# Patient Record
Sex: FEMALE | Race: OTHER | HISPANIC OR LATINO | URBAN - METROPOLITAN AREA
[De-identification: names, ages, dates, MRNs, and addresses within clinical notes are randomized per-mention and may not be internally consistent; named-entity substitution may affect disease eponyms.]

---

## 2022-05-26 ENCOUNTER — OFFICE VISIT (OUTPATIENT)
Dept: URBAN - METROPOLITAN AREA CLINIC 60 | Facility: CLINIC | Age: 47
End: 2022-05-26

## 2022-07-09 ENCOUNTER — TELEPHONE ENCOUNTER (OUTPATIENT)
Dept: URBAN - METROPOLITAN AREA CLINIC 121 | Facility: CLINIC | Age: 47
End: 2022-07-09

## 2022-07-10 ENCOUNTER — TELEPHONE ENCOUNTER (OUTPATIENT)
Dept: URBAN - METROPOLITAN AREA CLINIC 121 | Facility: CLINIC | Age: 47
End: 2022-07-10

## 2022-07-10 RX ORDER — SUCRALFATE 1 G/1
TWICE A DAY TABLET ORAL TWICE A DAY
Refills: 0 | Status: ACTIVE | COMMUNITY
Start: 2022-05-26

## 2022-07-10 RX ORDER — OMEPRAZOLE 20 MG/1
ONCE A DAY CAPSULE, DELAYED RELEASE ORAL ONCE A DAY
Refills: 0 | Status: ACTIVE | COMMUNITY
Start: 2022-05-26

## 2022-07-28 ENCOUNTER — OFFICE VISIT (OUTPATIENT)
Dept: URBAN - METROPOLITAN AREA SURGERY CENTER 4 | Facility: SURGERY CENTER | Age: 47
End: 2022-07-28
Payer: COMMERCIAL

## 2022-07-28 DIAGNOSIS — K44.9 DIAPHRAGMATIC HERNIA WITHOUT OBSTRUCTION OR GANGRENE: ICD-10-CM

## 2022-07-28 DIAGNOSIS — K29.50 UNSPECIFIED CHRONIC GASTRITIS WITHOUT BLEEDING: ICD-10-CM

## 2022-07-28 DIAGNOSIS — K21.9 GASTRO-ESOPHAGEAL REFLUX DISEASE WITHOUT ESOPHAGITIS: ICD-10-CM

## 2022-07-28 PROCEDURE — G8918 PT W/O PREOP ORDER IV AB PRO: HCPCS | Performed by: INTERNAL MEDICINE

## 2022-07-28 PROCEDURE — 43239 EGD BIOPSY SINGLE/MULTIPLE: CPT | Performed by: INTERNAL MEDICINE

## 2022-07-28 PROCEDURE — G8907 PT DOC NO EVENTS ON DISCHARG: HCPCS | Performed by: INTERNAL MEDICINE

## 2022-08-01 ENCOUNTER — TELEPHONE ENCOUNTER (OUTPATIENT)
Dept: URBAN - METROPOLITAN AREA CLINIC 63 | Facility: CLINIC | Age: 47
End: 2022-08-01

## 2022-08-04 PROBLEM — 266435005 GASTRO-ESOPHAGEAL REFLUX DISEASE WITHOUT ESOPHAGITIS: Status: ACTIVE | Noted: 2022-08-04

## 2022-08-04 PROBLEM — 84568007 ATROPHIC GASTRITIS: Status: ACTIVE | Noted: 2022-08-04

## 2022-08-11 ENCOUNTER — WEB ENCOUNTER (OUTPATIENT)
Dept: URBAN - METROPOLITAN AREA CLINIC 60 | Facility: CLINIC | Age: 47
End: 2022-08-11

## 2022-08-11 ENCOUNTER — OFFICE VISIT (OUTPATIENT)
Dept: URBAN - METROPOLITAN AREA CLINIC 60 | Facility: CLINIC | Age: 47
End: 2022-08-11
Payer: COMMERCIAL

## 2022-08-11 VITALS
WEIGHT: 111 LBS | HEIGHT: 60 IN | RESPIRATION RATE: 20 BRPM | TEMPERATURE: 97.9 F | DIASTOLIC BLOOD PRESSURE: 70 MMHG | OXYGEN SATURATION: 98 % | SYSTOLIC BLOOD PRESSURE: 120 MMHG | BODY MASS INDEX: 21.79 KG/M2 | HEART RATE: 109 BPM

## 2022-08-11 DIAGNOSIS — K22.719 BARRETT'S ESOPHAGUS WITH DYSPLASIA: ICD-10-CM

## 2022-08-11 DIAGNOSIS — Z12.11 ENCOUNTER FOR SCREENING COLONOSCOPY: ICD-10-CM

## 2022-08-11 PROBLEM — 1082751000119109: Status: ACTIVE | Noted: 2022-08-11

## 2022-08-11 PROCEDURE — 99214 OFFICE O/P EST MOD 30 MIN: CPT | Performed by: NURSE PRACTITIONER

## 2022-08-11 RX ORDER — OMEPRAZOLE 20 MG/1
1 CAPSULE 30 MINUTES BEFORE MORNING MEAL CAPSULE, DELAYED RELEASE ORAL ONCE A DAY
Qty: 90 | Refills: 0 | OUTPATIENT
Start: 2022-08-11

## 2022-08-18 ENCOUNTER — LAB OUTSIDE AN ENCOUNTER (OUTPATIENT)
Dept: URBAN - METROPOLITAN AREA CLINIC 60 | Facility: CLINIC | Age: 47
End: 2022-08-18

## 2022-08-25 ENCOUNTER — TELEPHONE ENCOUNTER (OUTPATIENT)
Dept: URBAN - METROPOLITAN AREA SURGERY CENTER 4 | Facility: SURGERY CENTER | Age: 47
End: 2022-08-25

## 2022-08-25 RX ORDER — OMEPRAZOLE 20 MG/1
1 CAPSULE 30 MINUTES BEFORE MORNING MEAL CAPSULE, DELAYED RELEASE ORAL ONCE A DAY
Qty: 90 | Refills: 0 | Status: ACTIVE | COMMUNITY
Start: 2022-08-11

## 2022-08-25 RX ORDER — OMEPRAZOLE 40 MG/1
1 CAPSULE 30 MINUTES BEFORE MORNING MEAL CAPSULE, DELAYED RELEASE ORAL ONCE A DAY
Qty: 60 CAPSULE | Refills: 0 | OUTPATIENT
Start: 2022-08-29

## 2022-08-25 RX ORDER — SUCRALFATE 1 G/1
1 TABLET ON AN EMPTY STOMACH TABLET ORAL TWICE A DAY
Qty: 120 TABLET | Refills: 0 | OUTPATIENT
Start: 2022-08-29 | End: 2022-10-28

## 2022-09-08 ENCOUNTER — TELEPHONE ENCOUNTER (OUTPATIENT)
Dept: URBAN - METROPOLITAN AREA CLINIC 60 | Facility: CLINIC | Age: 47
End: 2022-09-08

## 2022-09-08 RX ORDER — OMEPRAZOLE 20 MG/1
1 CAPSULE 30 MINUTES BEFORE MORNING MEAL CAPSULE, DELAYED RELEASE ORAL ONCE A DAY
Qty: 90 | Refills: 0 | Status: ACTIVE | COMMUNITY
Start: 2022-08-11

## 2022-09-08 RX ORDER — OMEPRAZOLE 40 MG/1
1 CAPSULE 30 MINUTES BEFORE MORNING MEAL CAPSULE, DELAYED RELEASE ORAL ONCE A DAY
Qty: 60 CAPSULE | Refills: 0 | Status: ACTIVE | COMMUNITY
Start: 2022-08-29

## 2022-09-08 RX ORDER — SUCRALFATE 1 G/1
1 TABLET ON AN EMPTY STOMACH TABLET ORAL TWICE A DAY
Qty: 180 TABLET | Refills: 0 | OUTPATIENT
Start: 2022-09-09 | End: 2022-12-07

## 2022-09-08 RX ORDER — SUCRALFATE 1 G/1
1 TABLET ON AN EMPTY STOMACH TABLET ORAL TWICE A DAY
Qty: 120 TABLET | Refills: 0 | Status: ACTIVE | COMMUNITY
Start: 2022-08-29 | End: 2022-10-28

## 2022-09-12 ENCOUNTER — TELEPHONE ENCOUNTER (OUTPATIENT)
Dept: URBAN - METROPOLITAN AREA CLINIC 60 | Facility: CLINIC | Age: 47
End: 2022-09-12

## 2022-09-23 ENCOUNTER — CLAIMS CREATED FROM THE CLAIM WINDOW (OUTPATIENT)
Dept: URBAN - METROPOLITAN AREA SURGERY CENTER 4 | Facility: SURGERY CENTER | Age: 47
End: 2022-09-23

## 2022-09-23 ENCOUNTER — CLAIMS CREATED FROM THE CLAIM WINDOW (OUTPATIENT)
Dept: URBAN - METROPOLITAN AREA SURGERY CENTER 4 | Facility: SURGERY CENTER | Age: 47
End: 2022-09-23
Payer: COMMERCIAL

## 2022-09-23 DIAGNOSIS — K64.1 GRADE II INTERNAL HEMORRHOIDS: ICD-10-CM

## 2022-09-23 DIAGNOSIS — D12.0 ADENOMATOUS POLYP OF CECUM: ICD-10-CM

## 2022-09-23 DIAGNOSIS — D12.4 ADENOMATOUS POLYP OF DESCENDING COLON: ICD-10-CM

## 2022-09-23 DIAGNOSIS — Z12.11 ENCOUNTER FOR SCREENING COLONOSCOPY: ICD-10-CM

## 2022-09-23 PROCEDURE — 45380 COLONOSCOPY AND BIOPSY: CPT | Performed by: CLINIC/CENTER

## 2022-09-23 PROCEDURE — G8918 PT W/O PREOP ORDER IV AB PRO: HCPCS | Performed by: CLINIC/CENTER

## 2022-09-23 PROCEDURE — 45380 COLONOSCOPY AND BIOPSY: CPT | Performed by: INTERNAL MEDICINE

## 2022-09-23 PROCEDURE — G8907 PT DOC NO EVENTS ON DISCHARG: HCPCS | Performed by: CLINIC/CENTER

## 2022-09-23 RX ORDER — SUCRALFATE 1 G/1
1 TABLET ON AN EMPTY STOMACH TABLET ORAL TWICE A DAY
Qty: 120 TABLET | Refills: 0 | Status: ACTIVE | COMMUNITY
Start: 2022-08-29 | End: 2022-10-28

## 2022-09-23 RX ORDER — OMEPRAZOLE 40 MG/1
1 CAPSULE 30 MINUTES BEFORE MORNING MEAL CAPSULE, DELAYED RELEASE ORAL ONCE A DAY
Qty: 60 CAPSULE | Refills: 0 | Status: ACTIVE | COMMUNITY
Start: 2022-08-29

## 2022-09-23 RX ORDER — SUCRALFATE 1 G/1
1 TABLET ON AN EMPTY STOMACH TABLET ORAL TWICE A DAY
Qty: 180 TABLET | Refills: 0 | Status: ACTIVE | COMMUNITY
Start: 2022-09-09 | End: 2022-12-07

## 2022-09-23 RX ORDER — OMEPRAZOLE 20 MG/1
1 CAPSULE 30 MINUTES BEFORE MORNING MEAL CAPSULE, DELAYED RELEASE ORAL ONCE A DAY
Qty: 90 | Refills: 0 | Status: ACTIVE | COMMUNITY
Start: 2022-08-11

## 2022-10-07 ENCOUNTER — OFFICE VISIT (OUTPATIENT)
Dept: URBAN - METROPOLITAN AREA CLINIC 60 | Facility: CLINIC | Age: 47
End: 2022-10-07

## 2022-11-01 ENCOUNTER — LAB OUTSIDE AN ENCOUNTER (OUTPATIENT)
Dept: URBAN - METROPOLITAN AREA CLINIC 60 | Facility: CLINIC | Age: 47
End: 2022-11-01

## 2022-11-01 ENCOUNTER — TELEPHONE ENCOUNTER (OUTPATIENT)
Dept: URBAN - METROPOLITAN AREA CLINIC 63 | Facility: CLINIC | Age: 47
End: 2022-11-01

## 2022-11-01 ENCOUNTER — ERX REFILL RESPONSE (OUTPATIENT)
Dept: URBAN - METROPOLITAN AREA CLINIC 57 | Facility: CLINIC | Age: 47
End: 2022-11-01

## 2022-11-01 ENCOUNTER — OFFICE VISIT (OUTPATIENT)
Dept: URBAN - METROPOLITAN AREA CLINIC 60 | Facility: CLINIC | Age: 47
End: 2022-11-01
Payer: COMMERCIAL

## 2022-11-01 VITALS
HEIGHT: 60 IN | OXYGEN SATURATION: 98 % | BODY MASS INDEX: 21.79 KG/M2 | RESPIRATION RATE: 20 BRPM | HEART RATE: 97 BPM | TEMPERATURE: 97.9 F | WEIGHT: 111 LBS | DIASTOLIC BLOOD PRESSURE: 72 MMHG | SYSTOLIC BLOOD PRESSURE: 120 MMHG

## 2022-11-01 DIAGNOSIS — K29.50 CHRONIC GASTRITIS WITHOUT BLEEDING, UNSPECIFIED GASTRITIS TYPE: ICD-10-CM

## 2022-11-01 DIAGNOSIS — K44.9 HIATAL HERNIA: ICD-10-CM

## 2022-11-01 DIAGNOSIS — K21.9 GASTROESOPHAGEAL REFLUX DISEASE WITHOUT ESOPHAGITIS: ICD-10-CM

## 2022-11-01 DIAGNOSIS — K22.70 BARRETT'S ESOPHAGUS WITHOUT DYSPLASIA: ICD-10-CM

## 2022-11-01 PROBLEM — 8493009: Status: ACTIVE | Noted: 2022-09-09

## 2022-11-01 PROCEDURE — 99214 OFFICE O/P EST MOD 30 MIN: CPT | Performed by: NURSE PRACTITIONER

## 2022-11-01 RX ORDER — SUCRALFATE 1 G/1
1 TABLET ON AN EMPTY STOMACH TABLET ORAL TWICE A DAY
Qty: 180 TABLET | Refills: 0 | Status: ACTIVE | COMMUNITY
Start: 2022-09-09 | End: 2022-12-07

## 2022-11-01 RX ORDER — OMEPRAZOLE 40 MG/1
1 CAPSULE 30 MINUTES BEFORE MORNING MEAL CAPSULE, DELAYED RELEASE ORAL ONCE A DAY
Qty: 60 CAPSULE | Refills: 0 | OUTPATIENT

## 2022-11-01 RX ORDER — DEXLANSOPRAZOLE 30 MG/1
1 CAPSULE CAPSULE, DELAYED RELEASE ORAL ONCE A DAY
Qty: 30 | OUTPATIENT
Start: 2022-11-01

## 2022-11-01 RX ORDER — OMEPRAZOLE 40 MG/1
TAKE 1 CAPSULE 30 MINUTES BEFORE MORNING MEAL ORALLY ONCE A DAY 60 DAYS CAPSULE, DELAYED RELEASE ORAL
Qty: 30 CAPSULE | Refills: 0 | OUTPATIENT

## 2022-11-01 RX ORDER — OMEPRAZOLE 40 MG/1
1 CAPSULE 30 MINUTES BEFORE MORNING MEAL CAPSULE, DELAYED RELEASE ORAL ONCE A DAY
Qty: 60 CAPSULE | Refills: 0 | Status: ACTIVE | COMMUNITY
Start: 2022-08-29

## 2022-11-01 NOTE — HPI-TODAY'S VISIT:
5/22 Patient here today in good general state, she is here complaining of symptom of gastritis and reflux.  Her symptoms are chronic but now seems to be worse.  Her last EGD was done in [place] 3 years ago and it shows evidence of chronic gastritis and esophageal mucosa with chronic inflammation due to reflux.  Patient also complaining of symptom of dyspepsia and constipation.  The symptoms are chronic as well. Plan: Patient will do diet for gastritis and reflux, treatment with Carafate 1 g twice a day and omeprazole 20 mg once a day. EGD. Will increase exercises and fluid intake, a trial with Metamucil.  Some samples of Linzess were given to her. FibroScan and right upper quadrant ultrasound.  8/22 Right upper quadrant ultrasound shows evidence of pancreas obscured by overlying bowel gas, otherwise normal. EGD: Demonstrating small hiatal hernia and chronic gastritis.  Biopsy results duodenal mucosa with no pathologic changes.  Gastric antrum/body, biopsy with chronic inactive gastritis, negative for H. pylori.  GE junction biopsy shows evidence of reactive changes favor reflux induced, and glandular mucosa with focal intestinal metaplasia and mild inflammation, negative for dysplasia, Candida or malignancy. Patient is here today she says symptoms of gastritis and reflux are better.  She is requesting screening colonoscopy.  Never had colonoscopy done denies any rectal bleeding, or change in her bowel habits. 11/22 Patient colonoscopy shows evidence of a 7 mm polyp into the cecum, a 5 mm polyp into the descending colon, internal hemorrhoids, and tortuous colon.  Both polyps were tubular adenoma polyps.  EGD demonstrated small hiatal hernia, chronic gastritis, normal examined duodenum and esophagus.  Duodenal mucosa with no pathologic changes.  Gastric antrum/body biopsy chronic inactive gastritis, negative for H. pylori.  GE junction biopsy esophageal squamous mucosa with reactive changes favored reflux induced.  Glandular mucosa with focal intestinal metaplasia and mild inflammation, negative for dysplasia, malignancy, or Candida.  Next colonoscopy in 5-year. Diet and treatment for esophagitis/esophageal intestinal metaplasia and chronic gastritis.  With omeprazole 20 mg daily.  EGD in 1 year. Noticed some improvement, but she is thinking, omeprazole is not working good for her.  She is still having acid reflux mainly in the morning time, we will start on Dexilant 30 mg twice a day.  Continue with Carafate for 4 weeks more.  We will do manometry studies and upper GI series.

## 2022-11-03 ENCOUNTER — TELEPHONE ENCOUNTER (OUTPATIENT)
Dept: URBAN - METROPOLITAN AREA CLINIC 63 | Facility: CLINIC | Age: 47
End: 2022-11-03

## 2022-11-04 ENCOUNTER — TELEPHONE ENCOUNTER (OUTPATIENT)
Dept: URBAN - METROPOLITAN AREA CLINIC 64 | Facility: CLINIC | Age: 47
End: 2022-11-04

## 2022-11-04 RX ORDER — OMEPRAZOLE 40 MG/1
1 CAPSULE 30 MINUTES BEFORE MORNING MEAL CAPSULE, DELAYED RELEASE ORAL ONCE A DAY
Qty: 60 CAPSULE | Refills: 0 | Status: ACTIVE | COMMUNITY
Start: 2022-08-29

## 2022-11-04 RX ORDER — SUCRALFATE 1 G/1
1 TABLET ON AN EMPTY STOMACH TABLET ORAL TWICE A DAY
Qty: 180 TABLET | Refills: 0 | Status: ACTIVE | COMMUNITY
Start: 2022-09-09 | End: 2022-12-07

## 2022-11-04 RX ORDER — ESOMEPRAZOLE MAGNESIUM 20 MG/1
1 CAPSULE CAPSULE, DELAYED RELEASE ORAL ONCE A DAY
Qty: 90 CAPSULE | Refills: 0 | OUTPATIENT
Start: 2022-11-04

## 2022-11-04 RX ORDER — DEXLANSOPRAZOLE 30 MG/1
1 CAPSULE CAPSULE, DELAYED RELEASE ORAL ONCE A DAY
Qty: 30 | Status: ACTIVE | COMMUNITY
Start: 2022-11-01

## 2022-12-01 ENCOUNTER — OFFICE VISIT (OUTPATIENT)
Dept: URBAN - METROPOLITAN AREA CLINIC 60 | Facility: CLINIC | Age: 47
End: 2022-12-01

## 2022-12-01 ENCOUNTER — CLAIMS CREATED FROM THE CLAIM WINDOW (OUTPATIENT)
Dept: URBAN - METROPOLITAN AREA CLINIC 61 | Facility: CLINIC | Age: 47
End: 2022-12-01
Payer: COMMERCIAL

## 2022-12-01 DIAGNOSIS — K21.9 GASTROESOPHAGEAL REFLUX DISEASE WITHOUT ESOPHAGITIS: ICD-10-CM

## 2022-12-01 DIAGNOSIS — K44.9 HIATAL HERNIA: ICD-10-CM

## 2022-12-01 DIAGNOSIS — K22.70 BARRETT'S ESOPHAGUS WITHOUT DYSPLASIA: ICD-10-CM

## 2022-12-01 PROCEDURE — 91037 ESOPH IMPED FUNCTION TEST: CPT | Performed by: INTERNAL MEDICINE

## 2022-12-01 PROCEDURE — 91035 G-ESOPH REFLX TST W/ELECTROD: CPT | Performed by: INTERNAL MEDICINE

## 2022-12-01 RX ORDER — OMEPRAZOLE 40 MG/1
1 CAPSULE 30 MINUTES BEFORE MORNING MEAL CAPSULE, DELAYED RELEASE ORAL ONCE A DAY
Qty: 60 CAPSULE | Refills: 0 | Status: ACTIVE | COMMUNITY
Start: 2022-08-29

## 2022-12-01 RX ORDER — DEXLANSOPRAZOLE 30 MG/1
1 CAPSULE CAPSULE, DELAYED RELEASE ORAL ONCE A DAY
Qty: 30 | Status: ACTIVE | COMMUNITY
Start: 2022-11-01

## 2022-12-01 RX ORDER — ESOMEPRAZOLE MAGNESIUM 20 MG/1
1 CAPSULE CAPSULE, DELAYED RELEASE ORAL ONCE A DAY
Qty: 90 CAPSULE | Refills: 0 | Status: ACTIVE | COMMUNITY
Start: 2022-11-04

## 2022-12-01 RX ORDER — SUCRALFATE 1 G/1
1 TABLET ON AN EMPTY STOMACH TABLET ORAL TWICE A DAY
Qty: 180 TABLET | Refills: 0 | Status: ACTIVE | COMMUNITY
Start: 2022-09-09 | End: 2022-12-07

## 2022-12-06 PROBLEM — 266435005: Status: ACTIVE | Noted: 2022-11-01

## 2022-12-08 ENCOUNTER — OFFICE VISIT (OUTPATIENT)
Dept: URBAN - METROPOLITAN AREA CLINIC 60 | Facility: CLINIC | Age: 47
End: 2022-12-08
Payer: COMMERCIAL

## 2022-12-08 VITALS
RESPIRATION RATE: 20 BRPM | BODY MASS INDEX: 21.79 KG/M2 | HEIGHT: 60 IN | TEMPERATURE: 97.8 F | DIASTOLIC BLOOD PRESSURE: 78 MMHG | OXYGEN SATURATION: 98 % | HEART RATE: 81 BPM | SYSTOLIC BLOOD PRESSURE: 120 MMHG | WEIGHT: 111 LBS

## 2022-12-08 DIAGNOSIS — K21.00 GASTROESOPHAGEAL REFLUX DISEASE WITH ESOPHAGITIS WITHOUT HEMORRHAGE: ICD-10-CM

## 2022-12-08 DIAGNOSIS — K64.1 GRADE II HEMORRHOIDS: ICD-10-CM

## 2022-12-08 DIAGNOSIS — K29.50 OTHER CHRONIC GASTRITIS WITHOUT HEMORRHAGE: ICD-10-CM

## 2022-12-08 DIAGNOSIS — K44.9 HIATAL HERNIA: ICD-10-CM

## 2022-12-08 DIAGNOSIS — K22.70 BARRETT'S ESOPHAGUS WITHOUT DYSPLASIA: ICD-10-CM

## 2022-12-08 DIAGNOSIS — D12.6 ADENOMATOUS POLYP OF COLON, UNSPECIFIED PART OF COLON: ICD-10-CM

## 2022-12-08 PROBLEM — 302914006: Status: ACTIVE | Noted: 2022-11-01

## 2022-12-08 PROCEDURE — 99214 OFFICE O/P EST MOD 30 MIN: CPT | Performed by: NURSE PRACTITIONER

## 2022-12-08 RX ORDER — PANTOPRAZOLE SODIUM 20 MG/1
1 TABLET TABLET, DELAYED RELEASE ORAL ONCE A DAY
Qty: 90 TABLETS | Refills: 0 | OUTPATIENT

## 2022-12-08 RX ORDER — SUCRALFATE 1 G/1
1 TABLET ON AN EMPTY STOMACH TABLET ORAL TWICE A DAY
Status: ACTIVE | COMMUNITY

## 2022-12-08 RX ORDER — FAMOTIDINE 20 MG/1
1 TABLET AT BEDTIME AS NEEDED TABLET, FILM COATED ORAL ONCE A DAY
Qty: 90 TABLET | Refills: 0 | OUTPATIENT

## 2022-12-08 RX ORDER — ESOMEPRAZOLE MAGNESIUM 20 MG/1
1 CAPSULE CAPSULE, DELAYED RELEASE ORAL BID
Qty: 180 CAPSULE | Refills: 0 | Status: ACTIVE | COMMUNITY
Start: 2022-11-04

## 2022-12-08 NOTE — HPI-TODAY'S VISIT:
5/22 Patient here today in good general state, she is here complaining of symptom of gastritis and reflux.  Her symptoms are chronic but now seems to be worse.  Her last EGD was done in [place] 3 years ago and it shows evidence of chronic gastritis and esophageal mucosa with chronic inflammation due to reflux.  Patient also complaining of symptom of dyspepsia and constipation.  The symptoms are chronic as well. Plan: Patient will do diet for gastritis and reflux, treatment with Carafate 1 g twice a day and omeprazole 20 mg once a day. EGD. Will increase exercises and fluid intake, a trial with Metamucil.  Some samples of Linzess were given to her. FibroScan and right upper quadrant ultrasound.  8/22 Right upper quadrant ultrasound shows evidence of pancreas obscured by overlying bowel gas, otherwise normal. EGD: Demonstrating small hiatal hernia and chronic gastritis.  Biopsy results duodenal mucosa with no pathologic changes.  Gastric antrum/body, biopsy with chronic inactive gastritis, negative for H. pylori.  GE junction biopsy shows evidence of reactive changes favor reflux induced, and glandular mucosa with focal intestinal metaplasia and mild inflammation, negative for dysplasia, Candida or malignancy. Patient is here today she says symptoms of gastritis and reflux are better.  She is requesting screening colonoscopy.  Never had colonoscopy done denies any rectal bleeding, or change in her bowel habits. 11/22 Patient colonoscopy shows evidence of a 7 mm polyp into the cecum, a 5 mm polyp into the descending colon, internal hemorrhoids, and tortuous colon.  Both polyps were tubular adenoma polyps.  EGD demonstrated small hiatal hernia, chronic gastritis, normal examined duodenum and esophagus.  Duodenal mucosa with no pathologic changes.  Gastric antrum/body biopsy chronic inactive gastritis, negative for H. pylori.  GE junction biopsy esophageal squamous mucosa with reactive changes favored reflux induced.  Glandular mucosa with focal intestinal metaplasia and mild inflammation, negative for dysplasia, malignancy, or Candida.  Next colonoscopy in 5-year. Diet and treatment for esophagitis/esophageal intestinal metaplasia and chronic gastritis.  With omeprazole 20 mg daily.  EGD in 1 year. Noticed some improvement, but she is thinking, omeprazole is not working good for her.  She is still having acid reflux mainly in the morning time, we will start on Dexilant 30 mg twice a day.  Continue with Carafate for 4 weeks more.  We will do manometry studies and upper GI series.  12/22 Patient is here today complaining of severe symptoms of reflux, she is doing treatment with PPI and Carafate, also doing diet with no improvement of her symptoms.  Esophageal manometry and upper GI series were normal, no evidence of reflux or any other abnormality.  We will change her treatment to pantoprazole 20 mg once a day and famotidine 40 mg at bedtime.  Patient will continue with diet.  I will see her in a month.

## 2022-12-19 ENCOUNTER — TELEPHONE ENCOUNTER (OUTPATIENT)
Dept: URBAN - METROPOLITAN AREA CLINIC 64 | Facility: CLINIC | Age: 47
End: 2022-12-19

## 2022-12-19 RX ORDER — FAMOTIDINE 40 MG/1
1 TABLET AT BEDTIME TABLET, FILM COATED ORAL ONCE A DAY
Qty: 30 | OUTPATIENT
Start: 2022-12-19

## 2022-12-30 ENCOUNTER — TELEPHONE ENCOUNTER (OUTPATIENT)
Dept: URBAN - METROPOLITAN AREA CLINIC 63 | Facility: CLINIC | Age: 47
End: 2022-12-30

## 2023-01-06 ENCOUNTER — OFFICE VISIT (OUTPATIENT)
Dept: URBAN - METROPOLITAN AREA CLINIC 60 | Facility: CLINIC | Age: 48
End: 2023-01-06

## 2023-01-17 ENCOUNTER — OFFICE VISIT (OUTPATIENT)
Dept: URBAN - METROPOLITAN AREA CLINIC 60 | Facility: CLINIC | Age: 48
End: 2023-01-17
Payer: COMMERCIAL

## 2023-01-17 VITALS
BODY MASS INDEX: 21.4 KG/M2 | OXYGEN SATURATION: 99 % | TEMPERATURE: 98.1 F | HEIGHT: 60 IN | SYSTOLIC BLOOD PRESSURE: 120 MMHG | WEIGHT: 109 LBS | RESPIRATION RATE: 20 BRPM | DIASTOLIC BLOOD PRESSURE: 76 MMHG | HEART RATE: 95 BPM

## 2023-01-17 DIAGNOSIS — K64.1 GRADE II HEMORRHOIDS: ICD-10-CM

## 2023-01-17 DIAGNOSIS — D12.6 ADENOMATOUS POLYP OF COLON, UNSPECIFIED PART OF COLON: ICD-10-CM

## 2023-01-17 DIAGNOSIS — K44.9 HIATAL HERNIA: ICD-10-CM

## 2023-01-17 DIAGNOSIS — K29.50 OTHER CHRONIC GASTRITIS WITHOUT HEMORRHAGE: ICD-10-CM

## 2023-01-17 DIAGNOSIS — K21.00 GASTROESOPHAGEAL REFLUX DISEASE WITH ESOPHAGITIS WITHOUT HEMORRHAGE: ICD-10-CM

## 2023-01-17 DIAGNOSIS — F41.9 ANXIETY: ICD-10-CM

## 2023-01-17 PROBLEM — 266433003: Status: ACTIVE | Noted: 2022-12-08

## 2023-01-17 PROBLEM — 48694002: Status: ACTIVE | Noted: 2023-01-17

## 2023-01-17 PROBLEM — 8493009: Status: ACTIVE | Noted: 2022-11-04

## 2023-01-17 PROCEDURE — 99213 OFFICE O/P EST LOW 20 MIN: CPT | Performed by: NURSE PRACTITIONER

## 2023-01-17 RX ORDER — PANTOPRAZOLE SODIUM 20 MG/1
1 TABLET TABLET, DELAYED RELEASE ORAL ONCE A DAY
Qty: 90 TABLETS | Refills: 0 | Status: ACTIVE | COMMUNITY

## 2023-01-17 RX ORDER — PANTOPRAZOLE SODIUM 20 MG/1
1 TABLET TABLET, DELAYED RELEASE ORAL ONCE A DAY
Qty: 90 TABLETS | Refills: 0 | OUTPATIENT

## 2023-01-17 RX ORDER — FAMOTIDINE 20 MG/1
1 TABLET AT BEDTIME AS NEEDED TABLET, FILM COATED ORAL ONCE A DAY
Qty: 90 TABLET | Refills: 0 | OUTPATIENT

## 2023-01-17 RX ORDER — FAMOTIDINE 20 MG/1
1 TABLET AT BEDTIME AS NEEDED TABLET, FILM COATED ORAL ONCE A DAY
Qty: 90 TABLET | Refills: 0 | Status: ACTIVE | COMMUNITY

## 2023-01-17 NOTE — HPI-TODAY'S VISIT:
5/22 Patient here today in good general state, she is here complaining of symptom of gastritis and reflux.  Her symptoms are chronic but now seems to be worse.  Her last EGD was done in [place] 3 years ago and it shows evidence of chronic gastritis and esophageal mucosa with chronic inflammation due to reflux.  Patient also complaining of symptom of dyspepsia and constipation.  The symptoms are chronic as well. Plan: Patient will do diet for gastritis and reflux, treatment with Carafate 1 g twice a day and omeprazole 20 mg once a day. EGD. Will increase exercises and fluid intake, a trial with Metamucil.  Some samples of Linzess were given to her. FibroScan and right upper quadrant ultrasound.  8/22 Right upper quadrant ultrasound shows evidence of pancreas obscured by overlying bowel gas, otherwise normal. EGD: Demonstrating small hiatal hernia and chronic gastritis.  Biopsy results duodenal mucosa with no pathologic changes.  Gastric antrum/body, biopsy with chronic inactive gastritis, negative for H. pylori.  GE junction biopsy shows evidence of reactive changes favor reflux induced, and glandular mucosa with focal intestinal metaplasia and mild inflammation, negative for dysplasia, Candida or malignancy. Patient is here today she says symptoms of gastritis and reflux are better.  She is requesting screening colonoscopy.  Never had colonoscopy done denies any rectal bleeding, or change in her bowel habits. 11/22 Patient colonoscopy shows evidence of a 7 mm polyp into the cecum, a 5 mm polyp into the descending colon, internal hemorrhoids, and tortuous colon.  Both polyps were tubular adenoma polyps.  EGD demonstrated small hiatal hernia, chronic gastritis, normal examined duodenum and esophagus.  Duodenal mucosa with no pathologic changes.  Gastric antrum/body biopsy chronic inactive gastritis, negative for H. pylori.  GE junction biopsy esophageal squamous mucosa with reactive changes favored reflux induced.  Glandular mucosa with focal intestinal metaplasia and mild inflammation, negative for dysplasia, malignancy, or Candida.  Next colonoscopy in 5-year. Diet and treatment for esophagitis/esophageal intestinal metaplasia and chronic gastritis.  With omeprazole 20 mg daily.  EGD in 1 year. Noticed some improvement, but she is thinking, omeprazole is not working good for her.  She is still having acid reflux mainly in the morning time, we will start on Dexilant 30 mg twice a day.  Continue with Carafate for 4 weeks more.  We will do manometry studies and upper GI series.  12/22 Patient is here today complaining of severe symptoms of reflux, she is doing treatment with PPI and Carafate, also doing diet with no improvement of her symptoms.  Esophageal manometry and upper GI series were normal, no evidence of reflux or any other abnormality.  We will change her treatment to pantoprazole 20 mg once a day and famotidine 40 mg at bedtime.  Patient will continue with diet.  I will see her in a month.  1/23 Patient with medical history of Mai esophagus, hiatal hernia and chronic gastritis. She claims to have a lot of anxiety, which increase her symptoms of gastritis and reflux. She is doing diet and treatment with PPI and famotidine and still having flares of GERD.  Patient will continue with present plan and will see a psychologist.

## 2023-01-18 ENCOUNTER — ERX REFILL RESPONSE (OUTPATIENT)
Dept: URBAN - METROPOLITAN AREA CLINIC 57 | Facility: CLINIC | Age: 48
End: 2023-01-18

## 2023-01-18 RX ORDER — FAMOTIDINE 40 MG/1
TAKE 1 TABLET BY MOUTH EVERY DAY AT BEDTIME FOR 30 DAYS TABLET, FILM COATED ORAL
Qty: 30 TABLET | Refills: 0 | OUTPATIENT

## 2023-01-18 RX ORDER — FAMOTIDINE 20 MG/1
1 TABLET AT BEDTIME AS NEEDED TABLET, FILM COATED ORAL ONCE A DAY
Qty: 90 TABLET | Refills: 0 | OUTPATIENT

## 2023-02-15 ENCOUNTER — ERX REFILL RESPONSE (OUTPATIENT)
Dept: URBAN - METROPOLITAN AREA CLINIC 57 | Facility: CLINIC | Age: 48
End: 2023-02-15

## 2023-02-15 RX ORDER — FAMOTIDINE 40 MG/1
TAKE 1 TABLET BY MOUTH EVERY DAY AT BEDTIME FOR 30 DAYS TABLET, FILM COATED ORAL
Qty: 30 TABLET | Refills: 0 | OUTPATIENT

## 2023-02-15 RX ORDER — FAMOTIDINE 40 MG/1
TAKE 1 TABLET BY MOUTH EVERY DAY AT BEDTIME TABLET, FILM COATED ORAL
Qty: 30 TABLET | Refills: 0 | OUTPATIENT

## 2023-03-15 ENCOUNTER — OFFICE VISIT (OUTPATIENT)
Dept: URBAN - METROPOLITAN AREA CLINIC 60 | Facility: CLINIC | Age: 48
End: 2023-03-15
Payer: COMMERCIAL

## 2023-03-15 ENCOUNTER — LAB OUTSIDE AN ENCOUNTER (OUTPATIENT)
Dept: URBAN - METROPOLITAN AREA CLINIC 60 | Facility: CLINIC | Age: 48
End: 2023-03-15

## 2023-03-15 VITALS
TEMPERATURE: 97.9 F | HEART RATE: 97 BPM | SYSTOLIC BLOOD PRESSURE: 120 MMHG | RESPIRATION RATE: 20 BRPM | DIASTOLIC BLOOD PRESSURE: 78 MMHG | BODY MASS INDEX: 21.79 KG/M2 | OXYGEN SATURATION: 94 % | HEIGHT: 60 IN | WEIGHT: 111 LBS

## 2023-03-15 DIAGNOSIS — K31.84 GASTROPARESIS: ICD-10-CM

## 2023-03-15 DIAGNOSIS — K64.1 GRADE II HEMORRHOIDS: ICD-10-CM

## 2023-03-15 DIAGNOSIS — K82.8 DYSKINESIA OF GALLBLADDER: ICD-10-CM

## 2023-03-15 DIAGNOSIS — K44.9 HIATAL HERNIA: ICD-10-CM

## 2023-03-15 DIAGNOSIS — F41.9 ANXIETY: ICD-10-CM

## 2023-03-15 DIAGNOSIS — D12.6 ADENOMATOUS POLYP OF COLON, UNSPECIFIED PART OF COLON: ICD-10-CM

## 2023-03-15 DIAGNOSIS — K29.50 OTHER CHRONIC GASTRITIS WITHOUT HEMORRHAGE: ICD-10-CM

## 2023-03-15 DIAGNOSIS — K21.00 GASTROESOPHAGEAL REFLUX DISEASE WITH ESOPHAGITIS WITHOUT HEMORRHAGE: ICD-10-CM

## 2023-03-15 PROBLEM — 235675006: Status: ACTIVE | Noted: 2023-03-15

## 2023-03-15 PROCEDURE — 99213 OFFICE O/P EST LOW 20 MIN: CPT | Performed by: NURSE PRACTITIONER

## 2023-03-15 RX ORDER — FAMOTIDINE 40 MG/1
TAKE 1 TABLET BY MOUTH EVERY DAY AT BEDTIME TABLET, FILM COATED ORAL
Qty: 30 TABLET | Refills: 0 | Status: ACTIVE | COMMUNITY

## 2023-03-15 RX ORDER — PANTOPRAZOLE SODIUM 20 MG/1
1 TABLET TABLET, DELAYED RELEASE ORAL ONCE A DAY
Qty: 90 TABLETS | Refills: 0 | OUTPATIENT

## 2023-03-15 RX ORDER — SUCRALFATE 1 G/1
1 TABLET ON AN EMPTY STOMACH TABLET ORAL TWICE A DAY
Qty: 60 | OUTPATIENT
Start: 2023-03-15 | End: 2023-04-14

## 2023-03-15 RX ORDER — PANTOPRAZOLE SODIUM 20 MG/1
1 TABLET TABLET, DELAYED RELEASE ORAL ONCE A DAY
Qty: 90 TABLETS | Refills: 0 | Status: ACTIVE | COMMUNITY

## 2023-03-15 NOTE — HPI-TODAY'S VISIT:
5/22 Patient here today in good general state, she is here complaining of symptom of gastritis and reflux.  Her symptoms are chronic but now seems to be worse.  Her last EGD was done in [place] 3 years ago and it shows evidence of chronic gastritis and esophageal mucosa with chronic inflammation due to reflux.  Patient also complaining of symptom of dyspepsia and constipation.  The symptoms are chronic as well. Plan: Patient will do diet for gastritis and reflux, treatment with Carafate 1 g twice a day and omeprazole 20 mg once a day. EGD. Will increase exercises and fluid intake, a trial with Metamucil.  Some samples of Linzess were given to her. FibroScan and right upper quadrant ultrasound.  8/22 Right upper quadrant ultrasound shows evidence of pancreas obscured by overlying bowel gas, otherwise normal. EGD: Demonstrating small hiatal hernia and chronic gastritis.  Biopsy results duodenal mucosa with no pathologic changes.  Gastric antrum/body, biopsy with chronic inactive gastritis, negative for H. pylori.  GE junction biopsy shows evidence of reactive changes favor reflux induced, and glandular mucosa with focal intestinal metaplasia and mild inflammation, negative for dysplasia, Candida or malignancy. Patient is here today she says symptoms of gastritis and reflux are better.  She is requesting screening colonoscopy.  Never had colonoscopy done denies any rectal bleeding, or change in her bowel habits. 11/22 Patient colonoscopy shows evidence of a 7 mm polyp into the cecum, a 5 mm polyp into the descending colon, internal hemorrhoids, and tortuous colon.  Both polyps were tubular adenoma polyps.  EGD demonstrated small hiatal hernia, chronic gastritis, normal examined duodenum and esophagus.  Duodenal mucosa with no pathologic changes.  Gastric antrum/body biopsy chronic inactive gastritis, negative for H. pylori.  GE junction biopsy esophageal squamous mucosa with reactive changes favored reflux induced.  Glandular mucosa with focal intestinal metaplasia and mild inflammation, negative for dysplasia, malignancy, or Candida.  Next colonoscopy in 5-year. Diet and treatment for esophagitis/esophageal intestinal metaplasia and chronic gastritis.  With omeprazole 20 mg daily.  EGD in 1 year. Noticed some improvement, but she is thinking, omeprazole is not working good for her.  She is still having acid reflux mainly in the morning time, we will start on Dexilant 30 mg twice a day.  Continue with Carafate for 4 weeks more.  We will do manometry studies and upper GI series.  12/22 Patient is here today complaining of severe symptoms of reflux, she is doing treatment with PPI and Carafate, also doing diet with no improvement of her symptoms.  Esophageal manometry and upper GI series were normal, no evidence of reflux or any other abnormality.  We will change her treatment to pantoprazole 20 mg once a day and famotidine 40 mg at bedtime.  Patient will continue with diet.  I will see her in a month.  1/23 Patient with medical history of Mai esophagus, hiatal hernia and chronic gastritis. She claims to have a lot of anxiety, which increase her symptoms of gastritis and reflux. She is doing diet and treatment with PPI and famotidine and still having flares of GERD.  Patient will continue with present plan and will see a psychologist. 3/23 Patient here today complaining having symptoms of dyspepsia and reflux.  Patient has medical history of gallbladder dyskinesia with ejection fraction of 29%, chronic gastritis, small hiatal hernia, and Mai esophagus. Patient did visit a psychologist and is having treatment for anxiety. Patient will continue with PPI 20 mg a day, will have gastric empty studies, DC famotidine at bedtime, start on Carafate 1 g at bedtime.  Continue with diet for gastritis and reflux.  Patient refused to visit the surgeon to be evaluated for gallbladder dyskinesia and  dyspepsia..

## 2023-03-23 ENCOUNTER — ERX REFILL RESPONSE (OUTPATIENT)
Dept: URBAN - METROPOLITAN AREA CLINIC 57 | Facility: CLINIC | Age: 48
End: 2023-03-23

## 2023-03-23 RX ORDER — FAMOTIDINE 40 MG/1
TAKE 1 TABLET BY MOUTH EVERYDAY AT BEDTIME TABLET, FILM COATED ORAL
Qty: 30 TABLET | Refills: 0 | OUTPATIENT

## 2023-03-23 RX ORDER — FAMOTIDINE 40 MG/1
TAKE 1 TABLET BY MOUTH EVERY DAY AT BEDTIME TABLET, FILM COATED ORAL
Qty: 30 TABLET | Refills: 0 | OUTPATIENT

## 2023-04-14 ENCOUNTER — ERX REFILL RESPONSE (OUTPATIENT)
Dept: URBAN - METROPOLITAN AREA CLINIC 57 | Facility: CLINIC | Age: 48
End: 2023-04-14

## 2023-04-14 RX ORDER — FAMOTIDINE 40 MG/1
TAKE 1 TABLET BY MOUTH EVERYDAY AT BEDTIME TABLET, FILM COATED ORAL
Qty: 30 TABLET | Refills: 0 | OUTPATIENT

## 2023-04-26 ENCOUNTER — OFFICE VISIT (OUTPATIENT)
Dept: URBAN - METROPOLITAN AREA CLINIC 60 | Facility: CLINIC | Age: 48
End: 2023-04-26
Payer: COMMERCIAL

## 2023-04-26 VITALS
BODY MASS INDEX: 21.99 KG/M2 | TEMPERATURE: 97.7 F | OXYGEN SATURATION: 98 % | HEIGHT: 60 IN | SYSTOLIC BLOOD PRESSURE: 120 MMHG | WEIGHT: 112 LBS | HEART RATE: 92 BPM | RESPIRATION RATE: 20 BRPM | DIASTOLIC BLOOD PRESSURE: 72 MMHG

## 2023-04-26 DIAGNOSIS — F41.9 ANXIETY: ICD-10-CM

## 2023-04-26 DIAGNOSIS — K82.8 DYSKINESIA OF GALLBLADDER: ICD-10-CM

## 2023-04-26 DIAGNOSIS — D12.6 ADENOMATOUS POLYP OF COLON, UNSPECIFIED PART OF COLON: ICD-10-CM

## 2023-04-26 DIAGNOSIS — K44.9 HIATAL HERNIA: ICD-10-CM

## 2023-04-26 DIAGNOSIS — K64.1 GRADE II HEMORRHOIDS: ICD-10-CM

## 2023-04-26 DIAGNOSIS — K22.70 BARRETT'S ESOPHAGUS WITHOUT DYSPLASIA: ICD-10-CM

## 2023-04-26 PROCEDURE — 99213 OFFICE O/P EST LOW 20 MIN: CPT | Performed by: NURSE PRACTITIONER

## 2023-04-26 RX ORDER — PANTOPRAZOLE SODIUM 20 MG/1
1 TABLET TABLET, DELAYED RELEASE ORAL ONCE A DAY
Qty: 90 TABLETS | Refills: 0 | Status: ACTIVE | COMMUNITY

## 2023-04-26 RX ORDER — OMEPRAZOLE 20 MG/1
1 CAPSULE 30 MINUTES BEFORE MORNING MEAL CAPSULE, DELAYED RELEASE ORAL ONCE A DAY
Qty: 30 | OUTPATIENT
Start: 2023-04-26

## 2023-04-26 RX ORDER — FAMOTIDINE 40 MG/1
TAKE 1 TABLET BY MOUTH EVERYDAY AT BEDTIME TABLET, FILM COATED ORAL
Qty: 30 TABLET | Refills: 0 | Status: ACTIVE | COMMUNITY

## 2023-04-26 NOTE — PHYSICAL EXAM HENT:
Head,  normocephalic,  atraumatic,  Face, within normal limits,  Ears,  External ears within normal limits,  Nose/Nasopharynx,  External nose  normal appearance, no nasal discharge,  Mouth and Throat,  Oral cavity appearance normal. Mucosa normal. Lips,  Appearance normal 
No

## 2023-04-26 NOTE — HPI-TODAY'S VISIT:
5/22 Patient here today in good general state, she is here complaining of symptom of gastritis and reflux.  Her symptoms are chronic but now seems to be worse.  Her last EGD was done in [place] 3 years ago and it shows evidence of chronic gastritis and esophageal mucosa with chronic inflammation due to reflux.  Patient also complaining of symptom of dyspepsia and constipation.  The symptoms are chronic as well. Plan: Patient will do diet for gastritis and reflux, treatment with Carafate 1 g twice a day and omeprazole 20 mg once a day. EGD. Will increase exercises and fluid intake, a trial with Metamucil.  Some samples of Linzess were given to her. FibroScan and right upper quadrant ultrasound.  8/22 Right upper quadrant ultrasound shows evidence of pancreas obscured by overlying bowel gas, otherwise normal. EGD: Demonstrating small hiatal hernia and chronic gastritis.  Biopsy results duodenal mucosa with no pathologic changes.  Gastric antrum/body, biopsy with chronic inactive gastritis, negative for H. pylori.  GE junction biopsy shows evidence of reactive changes favor reflux induced, and glandular mucosa with focal intestinal metaplasia and mild inflammation, negative for dysplasia, Candida or malignancy. Patient is here today she says symptoms of gastritis and reflux are better.  She is requesting screening colonoscopy.  Never had colonoscopy done denies any rectal bleeding, or change in her bowel habits. 11/22 Patient colonoscopy shows evidence of a 7 mm polyp into the cecum, a 5 mm polyp into the descending colon, internal hemorrhoids, and tortuous colon.  Both polyps were tubular adenoma polyps.  EGD demonstrated small hiatal hernia, chronic gastritis, normal examined duodenum and esophagus.  Duodenal mucosa with no pathologic changes.  Gastric antrum/body biopsy chronic inactive gastritis, negative for H. pylori.  GE junction biopsy esophageal squamous mucosa with reactive changes favored reflux induced.  Glandular mucosa with focal intestinal metaplasia and mild inflammation, negative for dysplasia, malignancy, or Candida.  Next colonoscopy in 5-year. Diet and treatment for esophagitis/esophageal intestinal metaplasia and chronic gastritis.  With omeprazole 20 mg daily.  EGD in 1 year. Noticed some improvement, but she is thinking, omeprazole is not working good for her.  She is still having acid reflux mainly in the morning time, we will start on Dexilant 30 mg twice a day.  Continue with Carafate for 4 weeks more.  We will do manometry studies and upper GI series.  12/22 Patient is here today complaining of severe symptoms of reflux, she is doing treatment with PPI and Carafate, also doing diet with no improvement of her symptoms.  Esophageal manometry and upper GI series were normal, no evidence of reflux or any other abnormality.  We will change her treatment to pantoprazole 20 mg once a day and famotidine 40 mg at bedtime.  Patient will continue with diet.  I will see her in a month.  1/23 Patient with medical history of Mai esophagus, hiatal hernia and chronic gastritis. She claims to have a lot of anxiety, which increase her symptoms of gastritis and reflux. She is doing diet and treatment with PPI and famotidine and still having flares of GERD.  Patient will continue with present plan and will see a psychologist. 3/23 Patient here today complaining having symptoms of dyspepsia and reflux.  Patient has medical history of gallbladder dyskinesia with ejection fraction of 29%, chronic gastritis, small hiatal hernia, and Mai esophagus. Patient did visit a psychologist and is having treatment for anxiety. Patient will continue with PPI 20 mg a day, will have gastric empty studies, DC famotidine at bedtime, start on Carafate 1 g at bedtime.  Continue with diet for gastritis and reflux.  Patient refused to visit the surgeon to be evaluated for gallbladder dyskinesia and  dyspepsia. 4/23 Patient gastric emptying study shows evidence of probably normal study with not evidence of delayed gastric emptying. Patient here today in good general state.  She has no GI complaints today.  She has medical history of Mai esophagus.  Patient admitted having heartburn at times mainly in the morning time.  Her symptoms "get relieved after she brought her teeth". She will continue with diet for GERD and omeprazole 20 mg once a day.

## 2023-06-26 ENCOUNTER — TELEPHONE ENCOUNTER (OUTPATIENT)
Dept: URBAN - METROPOLITAN AREA CLINIC 63 | Facility: CLINIC | Age: 48
End: 2023-06-26

## 2023-06-26 RX ORDER — PANTOPRAZOLE SODIUM 40 MG/1
1 TABLET TABLET, DELAYED RELEASE ORAL ONCE A DAY
Qty: 30 | OUTPATIENT
Start: 2023-06-27

## 2023-08-08 ENCOUNTER — OFFICE VISIT (OUTPATIENT)
Dept: URBAN - METROPOLITAN AREA CLINIC 60 | Facility: CLINIC | Age: 48
End: 2023-08-08
Payer: COMMERCIAL

## 2023-08-08 VITALS
OXYGEN SATURATION: 97 % | DIASTOLIC BLOOD PRESSURE: 76 MMHG | HEIGHT: 60 IN | WEIGHT: 121 LBS | HEART RATE: 86 BPM | SYSTOLIC BLOOD PRESSURE: 120 MMHG | TEMPERATURE: 97.6 F | RESPIRATION RATE: 20 BRPM | BODY MASS INDEX: 23.75 KG/M2

## 2023-08-08 DIAGNOSIS — F41.9 ANXIETY: ICD-10-CM

## 2023-08-08 DIAGNOSIS — D12.6 ADENOMATOUS POLYP OF COLON, UNSPECIFIED PART OF COLON: ICD-10-CM

## 2023-08-08 DIAGNOSIS — K82.8 DYSKINESIA OF GALLBLADDER: ICD-10-CM

## 2023-08-08 DIAGNOSIS — K44.9 HIATAL HERNIA: ICD-10-CM

## 2023-08-08 DIAGNOSIS — K22.70 BARRETT'S ESOPHAGUS WITHOUT DYSPLASIA: ICD-10-CM

## 2023-08-08 DIAGNOSIS — K64.1 GRADE II HEMORRHOIDS: ICD-10-CM

## 2023-08-08 PROCEDURE — 99214 OFFICE O/P EST MOD 30 MIN: CPT | Performed by: NURSE PRACTITIONER

## 2023-08-08 RX ORDER — PANTOPRAZOLE SODIUM 40 MG/1
1 TABLET TABLET, DELAYED RELEASE ORAL ONCE A DAY
Qty: 30 | Refills: 2 | OUTPATIENT
Start: 2023-08-08

## 2023-08-08 RX ORDER — PANTOPRAZOLE SODIUM 40 MG/1
1 TABLET TABLET, DELAYED RELEASE ORAL ONCE A DAY
Status: ACTIVE | COMMUNITY

## 2023-08-08 NOTE — PHYSICAL EXAM LYMPHATIC:
Neck , No lymphadenopathy  on the discharge service for the patient. I have reviewed and made amendments to the documentation where necessary.

## 2023-08-08 NOTE — PHYSICAL EXAM CHEST:
No lesions,  no deformities, chest wall non-tender,  no masses present, breathing is unlabored without, normal breath sounds.  Problem: Gastrointestinal Bleeding (Adult)  Goal: Signs and Symptoms of Listed Potential Problems Will be Absent, Minimized or Managed (Gastrointestinal Bleeding)  Signs and symptoms of listed potential problems will be absent, minimized or managed by discharge/transition of care (reference Gastrointestinal Bleeding (Adult) CPG).   Outcome: No Change  Small-volume BRB BM's.  hgb slight downward trend, 12.0 now.

## 2023-08-08 NOTE — HPI-TODAY'S VISIT:
5/22 Patient here today in good general state, she is here complaining of symptom of gastritis and reflux.  Her symptoms are chronic but now seems to be worse.  Her last EGD was done in [place] 3 years ago and it shows evidence of chronic gastritis and esophageal mucosa with chronic inflammation due to reflux.  Patient also complaining of symptom of dyspepsia and constipation.  The symptoms are chronic as well. Plan: Patient will do diet for gastritis and reflux, treatment with Carafate 1 g twice a day and omeprazole 20 mg once a day. EGD. Will increase exercises and fluid intake, a trial with Metamucil.  Some samples of Linzess were given to her. FibroScan and right upper quadrant ultrasound.  8/22 Right upper quadrant ultrasound shows evidence of pancreas obscured by overlying bowel gas, otherwise normal. EGD: Demonstrating small hiatal hernia and chronic gastritis.  Biopsy results duodenal mucosa with no pathologic changes.  Gastric antrum/body, biopsy with chronic inactive gastritis, negative for H. pylori.  GE junction biopsy shows evidence of reactive changes favor reflux induced, and glandular mucosa with focal intestinal metaplasia and mild inflammation, negative for dysplasia, Candida or malignancy. Patient is here today she says symptoms of gastritis and reflux are better.  She is requesting screening colonoscopy.  Never had colonoscopy done denies any rectal bleeding, or change in her bowel habits. 11/22 Patient colonoscopy shows evidence of a 7 mm polyp into the cecum, a 5 mm polyp into the descending colon, internal hemorrhoids, and tortuous colon.  Both polyps were tubular adenoma polyps.  EGD demonstrated small hiatal hernia, chronic gastritis, normal examined duodenum and esophagus.  Duodenal mucosa with no pathologic changes.  Gastric antrum/body biopsy chronic inactive gastritis, negative for H. pylori.  GE junction biopsy esophageal squamous mucosa with reactive changes favored reflux induced.  Glandular mucosa with focal intestinal metaplasia and mild inflammation, negative for dysplasia, malignancy, or Candida.  Next colonoscopy in 5-year. Diet and treatment for esophagitis/esophageal intestinal metaplasia and chronic gastritis.  With omeprazole 20 mg daily.  EGD in 1 year. Noticed some improvement, but she is thinking, omeprazole is not working good for her.  She is still having acid reflux mainly in the morning time, we will start on Dexilant 30 mg twice a day.  Continue with Carafate for 4 weeks more.  We will do manometry studies and upper GI series.  12/22 Patient is here today complaining of severe symptoms of reflux, she is doing treatment with PPI and Carafate, also doing diet with no improvement of her symptoms.  Esophageal manometry and upper GI series were normal, no evidence of reflux or any other abnormality.  We will change her treatment to pantoprazole 20 mg once a day and famotidine 40 mg at bedtime.  Patient will continue with diet.  I will see her in a month.  1/23 Patient with medical history of Mai esophagus, hiatal hernia and chronic gastritis. She claims to have a lot of anxiety, which increase her symptoms of gastritis and reflux. She is doing diet and treatment with PPI and famotidine and still having flares of GERD.  Patient will continue with present plan and will see a psychologist. 3/23 Patient here today complaining having symptoms of dyspepsia and reflux.  Patient has medical history of gallbladder dyskinesia with ejection fraction of 29%, chronic gastritis, small hiatal hernia, and Mai esophagus. Patient did visit a psychologist and is having treatment for anxiety. Patient will continue with PPI 20 mg a day, will have gastric empty studies, DC famotidine at bedtime, start on Carafate 1 g at bedtime.  Continue with diet for gastritis and reflux.  Patient refused to visit the surgeon to be evaluated for gallbladder dyskinesia and  dyspepsia. 4/23 Patient gastric emptying study shows evidence of probably normal study with not evidence of delayed gastric emptying. Patient here today in good general state.  She has no GI complaints today.  She has medical history of Mai esophagus.  Patient admitted having heartburn at times mainly in the morning time.  Her symptoms "get relieved after she brought her teeth". She will continue with diet for GERD and omeprazole 20 mg once a day. 8/23 Patient here today in good general state.  She is here for her EGD surveillance due to medical history of Mai esophagus.  She said her symptoms of reflux are better with the use of pantoprazole 40 mg prescribed by primary doctor.  Patient will continue with diet long-term use of high-dose of PPI side effect was discussed with her.

## 2023-08-15 ENCOUNTER — LAB OUTSIDE AN ENCOUNTER (OUTPATIENT)
Dept: URBAN - METROPOLITAN AREA CLINIC 60 | Facility: CLINIC | Age: 48
End: 2023-08-15

## 2023-09-29 ENCOUNTER — OFFICE VISIT (OUTPATIENT)
Dept: URBAN - METROPOLITAN AREA SURGERY CENTER 4 | Facility: SURGERY CENTER | Age: 48
End: 2023-09-29

## 2023-10-19 ENCOUNTER — CLAIMS CREATED FROM THE CLAIM WINDOW (OUTPATIENT)
Dept: URBAN - METROPOLITAN AREA SURGERY CENTER 4 | Facility: SURGERY CENTER | Age: 48
End: 2023-10-19
Payer: COMMERCIAL

## 2023-10-19 DIAGNOSIS — K29.70 CHRONIC GASTRITIS: ICD-10-CM

## 2023-10-19 DIAGNOSIS — K22.89 OTHER SPECIFIED DISEASE OF ESOPHAGUS: ICD-10-CM

## 2023-10-19 DIAGNOSIS — K44.9 HIATAL HERNIA: ICD-10-CM

## 2023-10-19 DIAGNOSIS — K21.9 ESOPHAGEAL REFLUX: ICD-10-CM

## 2023-10-19 DIAGNOSIS — K29.70 GASTRITIS WITHOUT BLEEDING, UNSPECIFIED CHRONICITY, UNSPECIFIED GASTRITIS TYPE: ICD-10-CM

## 2023-10-19 DIAGNOSIS — K22.70 BARRETT ESOPHAGUS: ICD-10-CM

## 2023-10-19 DIAGNOSIS — K44.9 DIAPHRAGMATIC HERNIA WITHOUT OBSTRUCTION OR GANGRENE: ICD-10-CM

## 2023-10-19 PROCEDURE — 00731 ANES UPR GI NDSC PX NOS: CPT | Performed by: NURSE ANESTHETIST, CERTIFIED REGISTERED

## 2023-10-19 PROCEDURE — 43239 EGD BIOPSY SINGLE/MULTIPLE: CPT | Performed by: INTERNAL MEDICINE

## 2023-10-19 RX ORDER — PANTOPRAZOLE SODIUM 40 MG/1
1 TABLET TABLET, DELAYED RELEASE ORAL ONCE A DAY
Qty: 30 | Refills: 2 | Status: ACTIVE | COMMUNITY
Start: 2023-08-08

## 2023-10-19 RX ORDER — PANTOPRAZOLE SODIUM 40 MG/1
1 TABLET TABLET, DELAYED RELEASE ORAL ONCE A DAY
Status: ACTIVE | COMMUNITY

## 2023-10-20 ENCOUNTER — OFFICE VISIT (OUTPATIENT)
Dept: URBAN - METROPOLITAN AREA CLINIC 60 | Facility: CLINIC | Age: 48
End: 2023-10-20

## 2023-11-09 ENCOUNTER — OFFICE VISIT (OUTPATIENT)
Dept: URBAN - METROPOLITAN AREA SURGERY CENTER 4 | Facility: SURGERY CENTER | Age: 48
End: 2023-11-09

## 2023-11-15 ENCOUNTER — OFFICE VISIT (OUTPATIENT)
Dept: URBAN - METROPOLITAN AREA CLINIC 60 | Facility: CLINIC | Age: 48
End: 2023-11-15
Payer: COMMERCIAL

## 2023-11-15 VITALS
BODY MASS INDEX: 24.54 KG/M2 | DIASTOLIC BLOOD PRESSURE: 78 MMHG | TEMPERATURE: 97.7 F | WEIGHT: 125 LBS | RESPIRATION RATE: 20 BRPM | SYSTOLIC BLOOD PRESSURE: 120 MMHG | HEART RATE: 97 BPM | HEIGHT: 60 IN | OXYGEN SATURATION: 96 %

## 2023-11-15 DIAGNOSIS — D12.6 ADENOMATOUS POLYP OF COLON, UNSPECIFIED PART OF COLON: ICD-10-CM

## 2023-11-15 DIAGNOSIS — K64.1 GRADE II HEMORRHOIDS: ICD-10-CM

## 2023-11-15 DIAGNOSIS — K44.9 HIATAL HERNIA: ICD-10-CM

## 2023-11-15 DIAGNOSIS — K21.9 GASTROESOPHAGEAL REFLUX DISEASE WITHOUT ESOPHAGITIS: ICD-10-CM

## 2023-11-15 PROCEDURE — 99214 OFFICE O/P EST MOD 30 MIN: CPT | Performed by: NURSE PRACTITIONER

## 2023-11-15 RX ORDER — LIDOCAINE 50 MG/G
1 APPLICATION AS NEEDED CREAM TOPICAL
Qty: 60 GM | Refills: 1 | OUTPATIENT
Start: 2023-11-15 | End: 2024-03-14

## 2023-11-15 RX ORDER — LIDOCAINE HYDROCHLORIDE AND HYDROCORTISONE ACETATE 30; 25 MG/G; MG/G
1 APPLICATORFUL GEL RECTAL TWICE A DAY
Qty: 60 GM | Refills: 3 | OUTPATIENT
Start: 2023-11-15 | End: 2024-03-14

## 2023-11-15 RX ORDER — PANTOPRAZOLE SODIUM 20 MG/1
1 TABLET TABLET, DELAYED RELEASE ORAL ONCE A DAY
Qty: 90 TABLET | Refills: 0 | OUTPATIENT
Start: 2023-11-15

## 2023-11-15 NOTE — HPI-TODAY'S VISIT:
5/22 Patient here today in good general state, she is here complaining of symptom of gastritis and reflux.  Her symptoms are chronic but now seems to be worse.  Her last EGD was done in [place] 3 years ago and it shows evidence of chronic gastritis and esophageal mucosa with chronic inflammation due to reflux.  Patient also complaining of symptom of dyspepsia and constipation.  The symptoms are chronic as well. Plan: Patient will do diet for gastritis and reflux, treatment with Carafate 1 g twice a day and omeprazole 20 mg once a day. EGD. Will increase exercises and fluid intake, a trial with Metamucil.  Some samples of Linzess were given to her. FibroScan and right upper quadrant ultrasound.  8/22 Right upper quadrant ultrasound shows evidence of pancreas obscured by overlying bowel gas, otherwise normal. EGD: Demonstrating small hiatal hernia and chronic gastritis.  Biopsy results duodenal mucosa with no pathologic changes.  Gastric antrum/body, biopsy with chronic inactive gastritis, negative for H. pylori.  GE junction biopsy shows evidence of reactive changes favor reflux induced, and glandular mucosa with focal intestinal metaplasia and mild inflammation, negative for dysplasia, Candida or malignancy. Patient is here today she says symptoms of gastritis and reflux are better.  She is requesting screening colonoscopy.  Never had colonoscopy done denies any rectal bleeding, or change in her bowel habits. 11/22 Patient colonoscopy shows evidence of a 7 mm polyp into the cecum, a 5 mm polyp into the descending colon, internal hemorrhoids, and tortuous colon.  Both polyps were tubular adenoma polyps.  EGD demonstrated small hiatal hernia, chronic gastritis, normal examined duodenum and esophagus.  Duodenal mucosa with no pathologic changes.  Gastric antrum/body biopsy chronic inactive gastritis, negative for H. pylori.  GE junction biopsy esophageal squamous mucosa with reactive changes favored reflux induced.  Glandular mucosa with focal intestinal metaplasia and mild inflammation, negative for dysplasia, malignancy, or Candida.  Next colonoscopy in 5-year. Diet and treatment for esophagitis/esophageal intestinal metaplasia and chronic gastritis.  With omeprazole 20 mg daily.  EGD in 1 year. Noticed some improvement, but she is thinking, omeprazole is not working good for her.  She is still having acid reflux mainly in the morning time, we will start on Dexilant 30 mg twice a day.  Continue with Carafate for 4 weeks more.  We will do manometry studies and upper GI series.  12/22 Patient is here today complaining of severe symptoms of reflux, she is doing treatment with PPI and Carafate, also doing diet with no improvement of her symptoms.  Esophageal manometry and upper GI series were normal, no evidence of reflux or any other abnormality.  We will change her treatment to pantoprazole 20 mg once a day and famotidine 40 mg at bedtime.  Patient will continue with diet.  I will see her in a month.  1/23 Patient with medical history of Mai esophagus, hiatal hernia and chronic gastritis. She claims to have a lot of anxiety, which increase her symptoms of gastritis and reflux. She is doing diet and treatment with PPI and famotidine and still having flares of GERD.  Patient will continue with present plan and will see a psychologist. 3/23 Patient here today complaining having symptoms of dyspepsia and reflux.  Patient has medical history of gallbladder dyskinesia with ejection fraction of 29%, chronic gastritis, small hiatal hernia, and Mai esophagus. Patient did visit a psychologist and is having treatment for anxiety. Patient will continue with PPI 20 mg a day, will have gastric empty studies, DC famotidine at bedtime, start on Carafate 1 g at bedtime.  Continue with diet for gastritis and reflux.  Patient refused to visit the surgeon to be evaluated for gallbladder dyskinesia and  dyspepsia. 4/23 Patient gastric emptying study shows evidence of probably normal study with not evidence of delayed gastric emptying. Patient here today in good general state.  She has no GI complaints today.  She has medical history of Mai esophagus.  Patient admitted having heartburn at times mainly in the morning time.  Her symptoms "get relieved after she brought her teeth". She will continue with diet for GERD and omeprazole 20 mg once a day. 8/23 Patient here today in good general state.  She is here for her EGD surveillance due to medical history of Mai esophagus.  She said her symptoms of reflux are better with the use of pantoprazole 40 mg prescribed by primary doctor.  Patient will continue with diet long-term use of high-dose of PPI side effect was discussed with her. 11/23 Patient EGD shows evidence of esophageal mucosa changes secondary to established short segment of Mai esophagus, small hiatal hernia, chronic gastritis, normal examined duodenum.  Papilloma of the esophagus.  Biopsy result shows evidence of duodenal mucosa without significant histopathologic changes.  Antrum, biopsy shows evidence of gastric mucosa with reactive changes, negative for H. pylori, dysplasia, malignancy.  Lower third esophageal mucosa with chronic inflammation, negative for intestinal metaplasia, dysplasia, or malignancy.  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  Today patient is in good general state she said her symptoms of gastritis and reflux are better.  Patient will continue with diet and treatment.  Patient is complaining of having hemorrhoids irritation.  She will try rectal cream, avoid constipation, increase fluid and fiber intake.

## 2024-01-29 ENCOUNTER — ERX REFILL RESPONSE (OUTPATIENT)
Dept: URBAN - METROPOLITAN AREA CLINIC 60 | Facility: CLINIC | Age: 49
End: 2024-01-29

## 2024-01-29 RX ORDER — PANTOPRAZOLE SODIUM 20 MG/1
TAKE 1 TABLET BY MOUTH EVERY DAY TABLET, DELAYED RELEASE ORAL
Qty: 90 TABLET | Refills: 0 | OUTPATIENT

## 2024-01-29 RX ORDER — PANTOPRAZOLE SODIUM 20 MG/1
1 TABLET TABLET, DELAYED RELEASE ORAL ONCE A DAY
Qty: 90 TABLET | Refills: 0 | OUTPATIENT

## 2024-02-14 ENCOUNTER — OV EP (OUTPATIENT)
Dept: URBAN - METROPOLITAN AREA CLINIC 60 | Facility: CLINIC | Age: 49
End: 2024-02-14
Payer: COMMERCIAL

## 2024-02-14 VITALS
HEIGHT: 60 IN | RESPIRATION RATE: 20 BRPM | BODY MASS INDEX: 24.58 KG/M2 | SYSTOLIC BLOOD PRESSURE: 118 MMHG | WEIGHT: 125.2 LBS | TEMPERATURE: 98.3 F | HEART RATE: 85 BPM | OXYGEN SATURATION: 97 % | DIASTOLIC BLOOD PRESSURE: 78 MMHG

## 2024-02-14 DIAGNOSIS — K21.9 GASTROESOPHAGEAL REFLUX DISEASE WITHOUT ESOPHAGITIS: ICD-10-CM

## 2024-02-14 DIAGNOSIS — K44.9 HIATAL HERNIA: ICD-10-CM

## 2024-02-14 DIAGNOSIS — Z87.19 HISTORY OF BARRETT'S ESOPHAGUS: ICD-10-CM

## 2024-02-14 DIAGNOSIS — K29.50 OTHER CHRONIC GASTRITIS WITHOUT HEMORRHAGE: ICD-10-CM

## 2024-02-14 PROCEDURE — 99214 OFFICE O/P EST MOD 30 MIN: CPT | Performed by: NURSE PRACTITIONER

## 2024-02-14 RX ORDER — PANTOPRAZOLE SODIUM 20 MG/1
TAKE 1 TABLET BY MOUTH EVERY DAY TABLET, DELAYED RELEASE ORAL
Qty: 90 TABLET | Refills: 0 | Status: ACTIVE | COMMUNITY

## 2024-02-14 RX ORDER — LIDOCAINE 50 MG/G
1 APPLICATION AS NEEDED CREAM TOPICAL
Qty: 60 GM | Refills: 1 | Status: ACTIVE | COMMUNITY
Start: 2023-11-15 | End: 2024-03-14

## 2024-02-14 RX ORDER — FAMOTIDINE 20 MG/1
1 TABLET AT BEDTIME AS NEEDED TABLET, FILM COATED ORAL ONCE A DAY
Qty: 30 | OUTPATIENT
Start: 2024-02-14

## 2024-02-14 RX ORDER — PANTOPRAZOLE SODIUM 20 MG/1
TAKE 1 TABLET BY MOUTH EVERY DAY TABLET, DELAYED RELEASE ORAL ONCE A DAY
Qty: 90 TABLETS | Refills: 0 | OUTPATIENT

## 2024-02-14 RX ORDER — LIDOCAINE HYDROCHLORIDE AND HYDROCORTISONE ACETATE 30; 25 MG/G; MG/G
1 APPLICATORFUL GEL RECTAL TWICE A DAY
Qty: 60 GM | Refills: 3 | Status: ACTIVE | COMMUNITY
Start: 2023-11-15 | End: 2024-03-14

## 2024-02-14 NOTE — HPI-TODAY'S VISIT:
5/22 Patient here today in good general state, she is here complaining of symptom of gastritis and reflux.  Her symptoms are chronic but now seems to be worse.  Her last EGD was done in [place] 3 years ago and it shows evidence of chronic gastritis and esophageal mucosa with chronic inflammation due to reflux.  Patient also complaining of symptom of dyspepsia and constipation.  The symptoms are chronic as well. Plan: Patient will do diet for gastritis and reflux, treatment with Carafate 1 g twice a day and omeprazole 20 mg once a day. EGD. Will increase exercises and fluid intake, a trial with Metamucil.  Some samples of Linzess were given to her. FibroScan and right upper quadrant ultrasound.  8/22 Right upper quadrant ultrasound shows evidence of pancreas obscured by overlying bowel gas, otherwise normal. EGD: Demonstrating small hiatal hernia and chronic gastritis.  Biopsy results duodenal mucosa with no pathologic changes.  Gastric antrum/body, biopsy with chronic inactive gastritis, negative for H. pylori.  GE junction biopsy shows evidence of reactive changes favor reflux induced, and glandular mucosa with focal intestinal metaplasia and mild inflammation, negative for dysplasia, Candida or malignancy. Patient is here today she says symptoms of gastritis and reflux are better.  She is requesting screening colonoscopy.  Never had colonoscopy done denies any rectal bleeding, or change in her bowel habits. 11/22 Patient colonoscopy shows evidence of a 7 mm polyp into the cecum, a 5 mm polyp into the descending colon, internal hemorrhoids, and tortuous colon.  Both polyps were tubular adenoma polyps.  EGD demonstrated small hiatal hernia, chronic gastritis, normal examined duodenum and esophagus.  Duodenal mucosa with no pathologic changes.  Gastric antrum/body biopsy chronic inactive gastritis, negative for H. pylori.  GE junction biopsy esophageal squamous mucosa with reactive changes favored reflux induced.  Glandular mucosa with focal intestinal metaplasia and mild inflammation, negative for dysplasia, malignancy, or Candida.  Next colonoscopy in 5-year. Diet and treatment for esophagitis/esophageal intestinal metaplasia and chronic gastritis.  With omeprazole 20 mg daily.  EGD in 1 year. Noticed some improvement, but she is thinking, omeprazole is not working good for her.  She is still having acid reflux mainly in the morning time, we will start on Dexilant 30 mg twice a day.  Continue with Carafate for 4 weeks more.  We will do manometry studies and upper GI series.  12/22 Patient is here today complaining of severe symptoms of reflux, she is doing treatment with PPI and Carafate, also doing diet with no improvement of her symptoms.  Esophageal manometry and upper GI series were normal, no evidence of reflux or any other abnormality.  We will change her treatment to pantoprazole 20 mg once a day and famotidine 40 mg at bedtime.  Patient will continue with diet.  I will see her in a month.  1/23 Patient with medical history of Mai esophagus, hiatal hernia and chronic gastritis. She claims to have a lot of anxiety, which increase her symptoms of gastritis and reflux. She is doing diet and treatment with PPI and famotidine and still having flares of GERD.  Patient will continue with present plan and will see a psychologist. 3/23 Patient here today complaining having symptoms of dyspepsia and reflux.  Patient has medical history of gallbladder dyskinesia with ejection fraction of 29%, chronic gastritis, small hiatal hernia, and Mai esophagus. Patient did visit a psychologist and is having treatment for anxiety. Patient will continue with PPI 20 mg a day, will have gastric empty studies, DC famotidine at bedtime, start on Carafate 1 g at bedtime.  Continue with diet for gastritis and reflux.  Patient refused to visit the surgeon to be evaluated for gallbladder dyskinesia and  dyspepsia. 4/23 Patient gastric emptying study shows evidence of probably normal study with not evidence of delayed gastric emptying. Patient here today in good general state.  She has no GI complaints today.  She has medical history of Mai esophagus.  Patient admitted having heartburn at times mainly in the morning time.  Her symptoms "get relieved after she brought her teeth". She will continue with diet for GERD and omeprazole 20 mg once a day. 8/23 Patient here today in good general state.  She is here for her EGD surveillance due to medical history of Mai esophagus.  She said her symptoms of reflux are better with the use of pantoprazole 40 mg prescribed by primary doctor.  Patient will continue with diet long-term use of high-dose of PPI side effect was discussed with her. 11/23 Patient EGD shows evidence of esophageal mucosa changes secondary to established short segment of Mai esophagus, small hiatal hernia, chronic gastritis, normal examined duodenum.  Papilloma of the esophagus.  Biopsy result shows evidence of duodenal mucosa without significant histopathologic changes.  Antrum, biopsy shows evidence of gastric mucosa with reactive changes, negative for H. pylori, dysplasia, malignancy.  Lower third esophageal mucosa with chronic inflammation, negative for intestinal metaplasia, dysplasia, or malignancy.  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  Today patient is in good general state she said her symptoms of gastritis and reflux are better.  Patient will continue with diet and treatment.  Patient is complaining of having hemorrhoids irritation.  She will try rectal cream, avoid constipation, increase fluid and fiber intake. 2/24 Patient is here today in good general state.  She said symptoms of gastritis and reflux are controlled.  Patient has medical history of chronic gastritis, GERD, Mai esophagus, her last EGD with biopsy shows negative for intestinal metaplasia.  Patient did continue for a while with PPI, we are going to wean her from it and will start on famotidine continue with diet.

## 2024-07-26 ENCOUNTER — ERX REFILL RESPONSE (OUTPATIENT)
Dept: URBAN - METROPOLITAN AREA CLINIC 60 | Facility: CLINIC | Age: 49
End: 2024-07-26

## 2024-07-26 RX ORDER — PANTOPRAZOLE SODIUM 20 MG/1
TAKE 1 TABLET BY MOUTH EVERY DAY ORALLY ONCE A DAY 90 DAYS TABLET, DELAYED RELEASE ORAL
Qty: 90 TABLET | Refills: 0 | OUTPATIENT

## 2024-07-26 RX ORDER — PANTOPRAZOLE SODIUM 20 MG/1
TAKE 1 TABLET BY MOUTH EVERY DAY TABLET, DELAYED RELEASE ORAL ONCE A DAY
Qty: 90 TABLETS | Refills: 0 | OUTPATIENT

## 2024-08-06 ENCOUNTER — DASHBOARD ENCOUNTERS (OUTPATIENT)
Age: 49
End: 2024-08-06

## 2024-08-09 ENCOUNTER — OFFICE VISIT (OUTPATIENT)
Dept: URBAN - METROPOLITAN AREA CLINIC 60 | Facility: CLINIC | Age: 49
End: 2024-08-09

## 2024-08-09 RX ORDER — FAMOTIDINE 20 MG/1
1 TABLET AT BEDTIME AS NEEDED TABLET, FILM COATED ORAL ONCE A DAY
Qty: 30 | Refills: 0 | Status: ACTIVE | COMMUNITY

## 2024-08-09 RX ORDER — PANTOPRAZOLE SODIUM 20 MG/1
TAKE 1 TABLET BY MOUTH EVERY DAY ORALLY ONCE A DAY 90 DAYS TABLET, DELAYED RELEASE ORAL
Qty: 90 TABLET | Refills: 0 | Status: ACTIVE | COMMUNITY

## 2024-08-26 ENCOUNTER — ERX REFILL RESPONSE (OUTPATIENT)
Dept: URBAN - METROPOLITAN AREA CLINIC 60 | Facility: CLINIC | Age: 49
End: 2024-08-26

## 2024-08-26 RX ORDER — DEXLANSOPRAZOLE 30 MG/1
TAKE 1 TABLET BY MOUTH EVERY DAY ORALLY ONCE A DAY 90 DAYS CAPSULE, DELAYED RELEASE ORAL
Qty: 90 CAPSULE | Refills: 0 | OUTPATIENT

## 2024-08-26 RX ORDER — PANTOPRAZOLE SODIUM 20 MG/1
TAKE 1 TABLET BY MOUTH EVERY DAY ORALLY ONCE A DAY 90 DAYS TABLET, DELAYED RELEASE ORAL
Qty: 90 TABLET | Refills: 0 | OUTPATIENT

## 2024-09-17 ENCOUNTER — OFFICE VISIT (OUTPATIENT)
Dept: URBAN - METROPOLITAN AREA CLINIC 60 | Facility: CLINIC | Age: 49
End: 2024-09-17
Payer: COMMERCIAL

## 2024-09-17 VITALS
HEART RATE: 84 BPM | DIASTOLIC BLOOD PRESSURE: 78 MMHG | HEIGHT: 60 IN | SYSTOLIC BLOOD PRESSURE: 110 MMHG | BODY MASS INDEX: 25.52 KG/M2 | WEIGHT: 130 LBS | OXYGEN SATURATION: 98 % | TEMPERATURE: 97.9 F | RESPIRATION RATE: 20 BRPM

## 2024-09-17 DIAGNOSIS — K64.1 GRADE II HEMORRHOIDS: ICD-10-CM

## 2024-09-17 DIAGNOSIS — K59.04 CHRONIC IDIOPATHIC CONSTIPATION: ICD-10-CM

## 2024-09-17 DIAGNOSIS — K21.9 GASTROESOPHAGEAL REFLUX DISEASE WITHOUT ESOPHAGITIS: ICD-10-CM

## 2024-09-17 PROBLEM — 82934008: Status: ACTIVE | Noted: 2024-09-17

## 2024-09-17 PROCEDURE — 99214 OFFICE O/P EST MOD 30 MIN: CPT | Performed by: NURSE PRACTITIONER

## 2024-09-17 RX ORDER — PANTOPRAZOLE SODIUM 40 MG/1
1 TABLET TABLET, DELAYED RELEASE ORAL ONCE A DAY
Status: ACTIVE | COMMUNITY

## 2024-09-17 RX ORDER — LINACLOTIDE 72 UG/1
1 CAPSULE AT LEAST 30 MINUTES BEFORE THE FIRST MEAL OF THE DAY ON AN EMPTY STOMACH CAPSULE, GELATIN COATED ORAL ONCE A DAY
Qty: 90 | Refills: 3 | OUTPATIENT
Start: 2024-09-17 | End: 2025-09-12

## 2024-09-17 RX ORDER — PANTOPRAZOLE SODIUM 20 MG/1
TAKE 1 TABLET BY MOUTH EVERY DAY ORALLY ONCE A DAY 90 DAYS TABLET, DELAYED RELEASE ORAL
Qty: 90 TABLET | Refills: 0 | OUTPATIENT

## 2024-09-17 RX ORDER — FAMOTIDINE 20 MG/1
1 TABLET AT BEDTIME AS NEEDED TABLET, FILM COATED ORAL ONCE A DAY
Qty: 30 | Refills: 0 | Status: ACTIVE | COMMUNITY

## 2024-09-17 RX ORDER — LIDOCAINE HYDROCHLORIDE AND HYDROCORTISONE ACETATE 30; 25 MG/G; MG/G
1 APPLICATORFUL GEL RECTAL TWICE A DAY
Qty: 60 GM | Refills: 0 | OUTPATIENT
Start: 2024-09-17 | End: 2024-10-17

## 2024-09-17 NOTE — HPI-TODAY'S VISIT:
5/22 Patient here today in good general state, she is here complaining of symptom of gastritis and reflux.  Her symptoms are chronic but now seems to be worse.  Her last EGD was done in [place] 3 years ago, and it shows evidence of chronic gastritis and esophageal mucosa with chronic inflammation due to reflux.  Patient also complaining of symptom of dyspepsia and constipation.  The symptoms are chronic as well. Plan: Patient will do diet for gastritis and reflux, treatment with Carafate 1 g twice a day and omeprazole 20 mg once a day. EGD. Will increase exercises and fluid intake, a trial with Metamucil.  Some samples of Linzess were given to her. FibroScan and right upper quadrant ultrasound.  8/22 Right upper quadrant ultrasound shows evidence of pancreas obscured by overlying bowel gas, otherwise normal. EGD: Demonstrating small hiatal hernia and chronic gastritis.  Biopsy results duodenal mucosa with no pathologic changes.  Gastric antrum/body, biopsy with chronic inactive gastritis, negative for H. pylori.  GE junction biopsy shows evidence of reactive changes favor reflux induced, and glandular mucosa with focal intestinal metaplasia and mild inflammation, negative for dysplasia, Candida or malignancy. Patient is here today she says symptoms of gastritis and reflux are better.  She is requesting screening colonoscopy.  Never had colonoscopy done denies any rectal bleeding, or change in her bowel habits. 11/22 Patient colonoscopy shows evidence of a 7 mm polyp into the cecum, a 5 mm polyp into the descending colon, internal hemorrhoids, and tortuous colon.  Both polyps were tubular adenoma polyps.  EGD demonstrated small hiatal hernia, chronic gastritis, normal examined duodenum and esophagus.  Duodenal mucosa with no pathologic changes.  Gastric antrum/body biopsy chronic inactive gastritis, negative for H. pylori.  GE junction biopsy esophageal squamous mucosa with reactive changes favored reflux induced.  Glandular mucosa with focal intestinal metaplasia and mild inflammation, negative for dysplasia, malignancy, or Candida.  Next colonoscopy in 5-year. Diet and treatment for esophagitis/esophageal intestinal metaplasia and chronic gastritis.  With omeprazole 20 mg daily.  EGD in 1 year. Noticed some improvement, but she is thinking, omeprazole is not working good for her.  She is still having acid reflux mainly in the morning time, we will start on Dexilant 30 mg twice a day.  Continue with Carafate for 4 weeks more.  We will do manometry studies and upper GI series.  12/22 Patient is here today complaining of severe symptoms of reflux, she is doing treatment with PPI and Carafate, also doing diet with no improvement of her symptoms.  Esophageal manometry and upper GI series were normal, no evidence of reflux or any other abnormality.  We will change her treatment to pantoprazole 20 mg once a day and famotidine 40 mg at bedtime.  Patient will continue with diet.  I will see her in a month.  1/23 Patient with medical history of Mai esophagus, hiatal hernia and chronic gastritis. She claims to have a lot of anxiety, which increase her symptoms of gastritis and reflux. She is doing diet and treatment with PPI and famotidine and still having flares of GERD.  Patient will continue with present plan and will see a psychologist. 3/23 Patient here today complaining having symptoms of dyspepsia and reflux.  Patient has medical history of gallbladder dyskinesia with ejection fraction of 29%, chronic gastritis, small hiatal hernia, and Mai esophagus. Patient did visit a psychologist and is having treatment for anxiety. Patient will continue with PPI 20 mg a day, will have gastric empty studies, DC famotidine at bedtime, start on Carafate 1 g at bedtime.  Continue with diet for gastritis and reflux.  Patient refused to visit the surgeon to be evaluated for gallbladder dyskinesia and  dyspepsia. 4/23 Patient gastric emptying study shows evidence of probably normal study with not evidence of delayed gastric emptying. Patient here today in good general state.  She has no GI complaints today.  She has medical history of Mai esophagus.  Patient admitted having heartburn at times mainly in the morning time.  Her symptoms "get relieved after she brought her teeth". She will continue with diet for GERD and omeprazole 20 mg once a day. 8/23 Patient here today in good general state.  She is here for her EGD surveillance due to medical history of Ami esophagus.  She said her symptoms of reflux are better with the use of pantoprazole 40 mg prescribed by primary doctor.  Patient will continue with diet long-term use of high-dose of PPI side effect was discussed with her. 11/23 Patient EGD shows evidence of esophageal mucosa changes secondary to established short segment of Mai esophagus, small hiatal hernia, chronic gastritis, normal examined duodenum.  Papilloma of the esophagus.  Biopsy result shows evidence of duodenal mucosa without significant histopathologic changes.  Antrum, biopsy shows evidence of gastric mucosa with reactive changes, negative for H. pylori, dysplasia, malignancy.  Lower third esophageal mucosa with chronic inflammation, negative for intestinal metaplasia, dysplasia, or malignancy.  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  Today patient is in good general state she said her symptoms of gastritis and reflux are better.  Patient will continue with diet and treatment.  Patient is complaining of having hemorrhoids irritation.  She will try rectal cream, avoid constipation, increase fluid and fiber intake. 2/24 Patient is here today in good general state.  She said symptoms of gastritis and reflux are controlled.  Patient has medical history of chronic gastritis, GERD, Mai esophagus, her last EGD with biopsy shows negative for intestinal metaplasia.  Patient did continue for a while with PPI, we are going to wean her from it and will start on famotidine continue with diet. 9/24 Patient here today in good general state.  She is complaining of having constipation and hemorrhoids irritation.  Her symptoms started around a month ago.  Patient will start on Linzess 72 mcg and will do topical treatment for hemorrhoids.  Patient also complaining of symptom of reflux associated with meal intake.  Patient will resume diet and treatment with pantoprazole 20 mg once a day for 3 weeks.

## 2024-10-29 ENCOUNTER — OFFICE VISIT (OUTPATIENT)
Dept: URBAN - METROPOLITAN AREA CLINIC 60 | Facility: CLINIC | Age: 49
End: 2024-10-29